# Patient Record
Sex: MALE | Race: WHITE | NOT HISPANIC OR LATINO | Employment: OTHER | URBAN - METROPOLITAN AREA
[De-identification: names, ages, dates, MRNs, and addresses within clinical notes are randomized per-mention and may not be internally consistent; named-entity substitution may affect disease eponyms.]

---

## 2017-06-27 ENCOUNTER — HISTORICAL (OUTPATIENT)
Dept: ADMINISTRATIVE | Facility: HOSPITAL | Age: 72
End: 2017-06-27

## 2017-08-08 ENCOUNTER — HISTORICAL (OUTPATIENT)
Dept: ADMINISTRATIVE | Facility: HOSPITAL | Age: 72
End: 2017-08-08

## 2022-04-30 NOTE — OP NOTE
Patient:   Moo Galan            MRN: [id number]            FIN: [id number]               Age:   72 years     Sex:  Male     :  1945   Associated Diagnoses:   None   Author:   Timothy BURNS, Abdirizak BLACK       [facility]ys Laser and Phacoemulsification of Cataract with  Toric  Intraocular Implant   Preoperative Diagnosis: Cataract right eye   Postoperative Diagnosis : Cataract right eye  Surgeon: Abdirizak Aguirre MD  Assistant: JEANIE Bro  Anestheisa: Topical  Complications: None  After the patient underwent topical anesthesia along with IV sedation in the holding area, the patient was brought to the Catalys laser.  A time out was performed.  The capsulotomy and lens fragmentation were performed.  Then the patient was brought to the operating suite. The patient was marked at axis  0 and 180 degrees then prepped and draped in a sterile fashion. A pediatric tegaderm and a lid speculum were used to retract the upper and lower lashes and lids.  A 1.0mm paracentesis was then made at the 11 oclock position. Intraocular non-preserved 1% Xylocaine (4% diluted down to 1%) was irrigated into the anterior chamber. Endocoat was injected into the eye. A clear corneal incision was made with a 2.4 Keratome blade. BSS was used to hydro dissect the nucleus from the capsule. The nucleus was then phacoemulsified with the Abbott machine for a EFX of 23. The cortex was then removed with the  I/A hand piece and Helon was placed into the posterior bag of the eye. An posterior chamber implant [id number] of power 13.5 was placed in the  capsular bag @ 10 degrees. The Helon was then removed from the eye with the I/A hand piece . The anterior chamber was inflated with BSS and the wound was checked for leaks. The lid speculum was removed and a drop of Besivance was placed in the operative eye. The patient was brought to the surgery suite in stable condition.         d.o.s. 2017 @ [facility]

## 2022-04-30 NOTE — OP NOTE
Patient:   Moo Galan            MRN: [id number]            FIN: [id number]               Age:   71 years     Sex:  Male     :  1945   Associated Diagnoses:   None   Author:   Timothy BURNS, Abdirizak BLACK       Catalys Laser and Phacoemulsification of Cataract with  Toric  Intraocular Implant   Preoperative Diagnosis: Cataract left eye   Postoperative Diagnosis : Cataract left eye  Surgeon: Abdirizak Aguirre MD  Assistant: JEANIE Bro  Anestheisa: Topical  Complications: None    After the patient underwent topical anesthesia along with IV sedation in the holding area, the patient was brought to the Catalys laser.  A timeout was performed.  The capsulotomy and lens fragmentation were performed.  Then the patient was brought to the operating suite. The patient was marked at axis  0 and 180 degrees then prepped and draped in a sterile fashion. A pediatric tegaderm and a lid speculum were used to retract the upper and lower lashes and lids.  A 1.0mm paracentesis was then made at the 5 oclock and 11 oclock position. Intraocular non-preserved 1% Xylocaine (4% diluted down to 1%) was irrigated into the anterior chamber. Endocoat was injected into the eye. A clear corneal incision was made with a 2.4 Keratome blade. BSS was used to hydro dissect the nucleus from the capsule. The nucleus was then phacoemulsified with the Abbott machine for a EFX of 6. The cortex was then removed with the I/A hand piece and Helon was placed into the posterior bag of the eye. An posterior chamber implant [id number] of power 15.5 was placed in the capsular bag at 170 degrees. The Helon was then removed from the eye with the I/A hand piece . The anterior chamber was inflated with BSS and the wound was checked for leaks. The lid speculum was removed and a drop of Besivance was placed in the operative eye. The patient was brought to the recovery suite in stable condition.         d.o.s. 2017 @ [facility]